# Patient Record
Sex: FEMALE | URBAN - METROPOLITAN AREA
[De-identification: names, ages, dates, MRNs, and addresses within clinical notes are randomized per-mention and may not be internally consistent; named-entity substitution may affect disease eponyms.]

---

## 2019-12-30 ENCOUNTER — HOSPITAL ENCOUNTER (OUTPATIENT)
Dept: TELEMEDICINE | Facility: HOSPITAL | Age: 84
Discharge: HOME | End: 2019-12-30

## 2020-02-24 ENCOUNTER — HOSPITAL ENCOUNTER (OUTPATIENT)
Dept: INTENSIVE CARE | Facility: HOSPITAL | Age: 85
End: 2020-02-26
Attending: INTERNAL MEDICINE | Admitting: INTERNAL MEDICINE

## 2020-02-24 LAB
ABS NEUT (OLG): 3.51 X10(3)/MCL (ref 2.1–9.2)
ALBUMIN SERPL-MCNC: 3.8 GM/DL (ref 3.4–5)
ALBUMIN/GLOB SERPL: 1 {RATIO}
ALP SERPL-CCNC: 118 UNIT/L (ref 38–126)
ALT SERPL-CCNC: 27 UNIT/L (ref 12–78)
APPEARANCE, UA: CLEAR
APTT PPP: 30.9 SECOND(S) (ref 23.2–33.7)
AST SERPL-CCNC: 41 UNIT/L (ref 15–37)
BACTERIA SPEC CULT: ABNORMAL /HPF
BASOPHILS # BLD AUTO: 0 X10(3)/MCL (ref 0–0.2)
BASOPHILS NFR BLD AUTO: 0 %
BILIRUB SERPL-MCNC: 0.7 MG/DL (ref 0.2–1)
BILIRUB UR QL STRIP: NEGATIVE
BILIRUBIN DIRECT+TOT PNL SERPL-MCNC: 0.2 MG/DL (ref 0–0.2)
BILIRUBIN DIRECT+TOT PNL SERPL-MCNC: 0.5 MG/DL (ref 0–0.8)
BUN SERPL-MCNC: 21 MG/DL (ref 7–18)
CALCIUM SERPL-MCNC: 9.2 MG/DL (ref 8.5–10.1)
CHLORIDE SERPL-SCNC: 112 MMOL/L (ref 98–107)
CO2 SERPL-SCNC: 22 MMOL/L (ref 21–32)
COLOR UR: YELLOW
CREAT SERPL-MCNC: 0.85 MG/DL (ref 0.55–1.02)
EOSINOPHIL # BLD AUTO: 0 X10(3)/MCL (ref 0–0.9)
EOSINOPHIL NFR BLD AUTO: 1 %
ERYTHROCYTE [DISTWIDTH] IN BLOOD BY AUTOMATED COUNT: 15.2 % (ref 11.5–17)
GLOBULIN SER-MCNC: 3.7 GM/DL (ref 2.4–3.5)
GLUCOSE (UA): NEGATIVE
GLUCOSE SERPL-MCNC: 77 MG/DL (ref 74–106)
HCT VFR BLD AUTO: 32 % (ref 37–47)
HGB BLD-MCNC: 10.4 GM/DL (ref 12–16)
HGB UR QL STRIP: NEGATIVE
INR PPP: 1 (ref 0–1.3)
KETONES UR QL STRIP: NEGATIVE
LEUKOCYTE ESTERASE UR QL STRIP: ABNORMAL
LYMPHOCYTES # BLD AUTO: 1.5 X10(3)/MCL (ref 0.6–4.6)
LYMPHOCYTES NFR BLD AUTO: 26 %
MCH RBC QN AUTO: 29.8 PG (ref 27–31)
MCHC RBC AUTO-ENTMCNC: 32.5 GM/DL (ref 33–36)
MCV RBC AUTO: 91.7 FL (ref 80–94)
MONOCYTES # BLD AUTO: 0.6 X10(3)/MCL (ref 0.1–1.3)
MONOCYTES NFR BLD AUTO: 10 %
NEUTROPHILS # BLD AUTO: 3.51 X10(3)/MCL (ref 2.1–9.2)
NEUTROPHILS NFR BLD AUTO: 63 %
NITRITE UR QL STRIP: NEGATIVE
PH UR STRIP: 5 [PH] (ref 5–9)
PLATELET # BLD AUTO: 190 X10(3)/MCL (ref 130–400)
PMV BLD AUTO: 11.3 FL (ref 9.4–12.4)
POTASSIUM SERPL-SCNC: 4.4 MMOL/L (ref 3.5–5.1)
PROT SERPL-MCNC: 7.5 GM/DL (ref 6.4–8.2)
PROT UR QL STRIP: ABNORMAL
PROTHROMBIN TIME: 12.6 SECOND(S) (ref 11.1–13.7)
RBC # BLD AUTO: 3.49 X10(6)/MCL (ref 4.2–5.4)
RBC #/AREA URNS HPF: ABNORMAL /[HPF]
SODIUM SERPL-SCNC: 141 MMOL/L (ref 136–145)
SP GR UR STRIP: 1.01 (ref 1–1.03)
SQUAMOUS EPITHELIAL, UA: 7 /HPF (ref 0–4)
UROBILINOGEN UR STRIP-ACNC: 0.2
WBC # SPEC AUTO: 5.6 X10(3)/MCL (ref 4.5–11.5)
WBC #/AREA URNS HPF: 18 /HPF (ref 0–3)

## 2020-02-25 LAB
ABS NEUT (OLG): 2 X10(3)/MCL (ref 2.1–9.2)
ALBUMIN SERPL-MCNC: 3.2 GM/DL (ref 3.4–5)
ALBUMIN/GLOB SERPL: 0.9 {RATIO}
ALP SERPL-CCNC: 107 UNIT/L (ref 38–126)
ALT SERPL-CCNC: 24 UNIT/L (ref 12–78)
AST SERPL-CCNC: 32 UNIT/L (ref 15–37)
BASOPHILS # BLD AUTO: 0 X10(3)/MCL (ref 0–0.2)
BASOPHILS NFR BLD AUTO: 0 %
BILIRUB SERPL-MCNC: 0.5 MG/DL (ref 0.2–1)
BILIRUBIN DIRECT+TOT PNL SERPL-MCNC: 0.1 MG/DL (ref 0–0.2)
BILIRUBIN DIRECT+TOT PNL SERPL-MCNC: 0.4 MG/DL (ref 0–0.8)
BUN SERPL-MCNC: 20 MG/DL (ref 7–18)
CALCIUM SERPL-MCNC: 9 MG/DL (ref 8.5–10.1)
CHLORIDE SERPL-SCNC: 113 MMOL/L (ref 98–107)
CO2 SERPL-SCNC: 22 MMOL/L (ref 21–32)
CREAT SERPL-MCNC: 0.87 MG/DL (ref 0.55–1.02)
EOSINOPHIL # BLD AUTO: 0.1 X10(3)/MCL (ref 0–0.9)
EOSINOPHIL NFR BLD AUTO: 2 %
ERYTHROCYTE [DISTWIDTH] IN BLOOD BY AUTOMATED COUNT: 15.2 % (ref 11.5–17)
GLOBULIN SER-MCNC: 3.4 GM/DL (ref 2.4–3.5)
GLUCOSE SERPL-MCNC: 95 MG/DL (ref 74–106)
HCT VFR BLD AUTO: 32.3 % (ref 37–47)
HGB BLD-MCNC: 10.4 GM/DL (ref 12–16)
LYMPHOCYTES # BLD AUTO: 2.2 X10(3)/MCL (ref 0.6–4.6)
LYMPHOCYTES NFR BLD AUTO: 44 %
MCH RBC QN AUTO: 29.7 PG (ref 27–31)
MCHC RBC AUTO-ENTMCNC: 32.2 GM/DL (ref 33–36)
MCV RBC AUTO: 92.3 FL (ref 80–94)
MONOCYTES # BLD AUTO: 0.7 X10(3)/MCL (ref 0.1–1.3)
MONOCYTES NFR BLD AUTO: 13 %
NEUTROPHILS # BLD AUTO: 2 X10(3)/MCL (ref 2.1–9.2)
NEUTROPHILS NFR BLD AUTO: 40 %
PLATELET # BLD AUTO: 167 X10(3)/MCL (ref 130–400)
PMV BLD AUTO: 10.9 FL (ref 9.4–12.4)
POTASSIUM SERPL-SCNC: 4.7 MMOL/L (ref 3.5–5.1)
PROT SERPL-MCNC: 6.6 GM/DL (ref 6.4–8.2)
RBC # BLD AUTO: 3.5 X10(6)/MCL (ref 4.2–5.4)
SODIUM SERPL-SCNC: 141 MMOL/L (ref 136–145)
WBC # SPEC AUTO: 5 X10(3)/MCL (ref 4.5–11.5)

## 2021-04-24 ENCOUNTER — HISTORICAL (OUTPATIENT)
Dept: ADMINISTRATIVE | Facility: HOSPITAL | Age: 86
End: 2021-04-24

## 2021-04-30 LAB
FINAL CULTURE: NORMAL
FINAL CULTURE: NORMAL

## 2022-04-30 NOTE — ED PROVIDER NOTES
Patient:   Chanda Gracia            MRN: [id number]            FIN: [id number]               Age:   84 years     Sex:  Female     :  1935   Associated Diagnoses:   HTN (hypertension); HLD (hyperlipidemia); Hypoglycemia; TIA (transient ischemic attack); Diabetes   Author:   Lg Pisano MD      Basic Information   Time seen: Date & time 2020 11:45:00.   History source: Patient, family, EMS.   Arrival mode: Ambulance.   History limitation: None.   Additional information: Patient's physician(s): Maria Antonia ALEJO , Philipp Nathan, Chief Complaint from Nursing Triage Note : Chief Complaint   2020 11:27 CST      Chief Complaint           [facility]- pt reports she felt wobbly at 0400 this AM and couldn't use R hand. states numbness has resolved. [facility] then states pt was CBG 30 on scene with slurred speech. D5W 250 ml en route. CBG 78 on arrival. GCS 15.  .      History of Present Illness   The patient presents with 85 y/o female who presents via [facility] for right side weakness and slurred speech. EMS found cbg to be 30. gave d5w and now cbg 78. she has no slurred speech, no drift/droop. did not take her oral glucose meds this AM. rey coates and   I, Dr. Pisano, assumed care of this patient at 1156.    84 year old female with history of HTN, HLD, and DM presents to ED via EMS c/o R sided weakness and abnormal balance upon waking this morning around 0400. Patient states she had numbness and weakness in her right arm and her right leg.  She states she couldn't move the arm but it was very difficult.  She states it did not feel as if she slept on it wrong she states it felt different and it also involved the leg.  Family states patient fell to floor. Patient states symptoms have improved. She denies blood thinners or difficulty swallowing. CBG initially 30. Patient was given D5W 250ml per EMS. CBG 78 on arrival. When I got in the room patient was half-way through eating sandwich tray. .  The onset  was 0400 this morning upon waking.  The course/duration of symptoms is constant and improving.  The blood sugar was 30 mg/dL and method of detection: emergency medical services.  Risk factors consist of diabetes mellitus and hypertension.  Prior episodes: none.  Therapy today: emergency medical services.  Associated symptoms: none.  Additional history: none.        Review of Systems   Constitutional symptoms:  Negative except as documented in HPI   Skin symptoms:  Negative except as documented in HPI.   Eye symptoms:  Negative except as documented in HPI.   ENMT symptoms:  Negative except as documented in HPI.   Respiratory symptoms:  Negative except as documented in HPI.   Cardiovascular symptoms:  Negative except as documented in HPI.   Gastrointestinal symptoms:  Negative except as documented in HPI.   Genitourinary symptoms:  Negative except as documented in HPI.   Musculoskeletal symptoms:  Negative except as documented in HPI.   Neurologic symptoms:  Weakness (R sided), abnormal balance, No difficulty swallowing,    Endocrine symptoms:  Hypoglycemia.      Health Status   Allergies:    Allergic Reactions (Selected)  No Known Allergies  No Known Medication Allergies.   Medications: Per nurse's notes.      Past Medical/ Family/ Social History   Medical history:    Resolved  Diabetes mellitus (805122642):  Resolved.  HTN - Hypertension ([id number]):  Resolved.  Hypercholesterolemia ([id number]):  Resolved.  .   Surgical history:    Hysterectomy TONY IN HER 30'S.  .   Family history:    No family history items have been selected or recorded.  .   Social history: Alcohol use: Occasionally, Tobacco use: Denies, Drug use: Denies, Occupation: Unemployed, Family/social situation: Unmarried.      Physical Examination               Vital Signs   Vital Signs   2/24/2020 11:39 CST      Peripheral Pulse Rate     62 bpm                             Heart Rate Monitored      62 bpm                             Respiratory Rate           17 br/min                             SpO2                      97 %                             Oxygen Therapy            Room air  .   Measurements   2/24/2020 11:27 CST      Weight Dosing             55 kg                             Weight Measured and Calculated in Lbs     121.25 lb                             Weight Estimated          55 kg                             Height/Length Dosing      167 cm                             Height/Length Estimated   167 cm                             Body Mass Index Estimated 19.72 kg/m2  .   Basic Oxygen Information   2/24/2020 11:39 CST      SpO2                      97 %                             Oxygen Therapy            Room air  .   General:  Alert, no acute distress.    Skin:  Warm, dry   Head:  Normocephalic, atraumatic   Eye:  Pupils are equal, round and reactive to light, extraocular movements are intact.    Cardiovascular:  Regular rate and rhythm, Normal peripheral perfusion, No edema.    Respiratory:  Lungs are clear to auscultation, respirations are non-labored, breath sounds are equal, Symmetrical chest wall expansion.    Gastrointestinal:  Soft, Nontender, Non distended, Normal bowel sounds, Guarding: Negative, Rebound: Negative.    Musculoskeletal:  No deformity.   Neurological:  Alert and oriented to person, place, time, and situation, No focal neurological deficit observed, CN II-XII intact, normal sensory observed, normal motor observed, normal speech observed, ambulates WNL, Reflexes: pronator drift negative, Coordination: Finger(s) to nose normal.    Psychiatric:  Cooperative, appropriate mood & affect      Medical Decision Making   Rationale:  Symptoms from this morning clearly sound strokelike they have all completely resolved.  No previous stroke evaluation so she will require admission.  Aspirin was given in the ED.  Her sugar improved after being fed in the ED.   Documents reviewed:  Emergency department nurses' notes.   Orders  Launch  Order Profile (Selected)   Inpatient Orders  Ordered  Possible Stroke: 02/24/20 11:35:30 CST, Once  Ordered (Collected)  INR - Protime: STAT collect, 02/24/20 12:54:04 CST, BLOOD, Collected, Stop date 02/24/20 12:54:00 CST, Lab Collect  PTT: STAT collect, 02/24/20 12:54:04 CST, BLOOD, Collected, Stop date 02/24/20 12:54:00 CST, Lab Collect  Ordered (In-Lab)  CMP: STAT collect, 02/24/20 12:54:04 CST, BLOOD, Collected, Stop date 02/24/20 12:54:00 CST, Lab Collect  Urinalysis with reflex to culture: Stat collect, Urine, 02/24/20 11:55:00 CST, Stop date 02/24/20 11:55:00 CST, Nurse collect  Completed  Automated Diff: NOW collect, 02/24/20 12:54:00 CST, Blood, Collected, Stop date 02/24/20 12:54:00 CST, Lab Collect, Print Label By Order Location, 02/24/20 11:46:00 CST  CBC w/ Auto Diff: NOW collect, 02/24/20 12:54:04 CST, BLOOD, Collected, Stop date 02/24/20 12:54:00 CST, Lab Collect  CT Head W/O Contrast: Stat, 02/24/20 11:54:00 CST, Other (please specify), CVA, None, Ambulatory, Rad Type, Schedule this test, 02/24/20 11:54:00 CST  POC CBG: Blood, Stat collect, Collected, 02/24/20 11:32:07 CST  POC CBG: Blood, Stat collect, Collected, 02/24/20 12:46:26 CST  aspirin 325 mg oral tablet: 325 mg, form: Tab, Oral, Once, first dose 02/24/20 12:55:00 CST, stop date 02/24/20 12:55:00 CST, STAT, 4 chew tab = 5 grain ASA  .   Electrocardiogram:  Time 2/24/2020 14:27:00, rate 52, Sinus bradycardia no ST elevation or depression.    Results review:  Lab results : Lab View   2/24/2020 12:54 CST      Sodium Lvl                141 mmol/L                             Potassium Lvl             4.4 mmol/L                             Chloride                  112 mmol/L  HI                             CO2                       22.0 mmol/L                             Calcium Lvl               9.2 mg/dL                             Glucose Lvl               77 mg/dL                             BUN                       21.0 mg/dL  HI                              Creatinine                0.85 mg/dL                             eGFR-AA                   >60 mL/min/1.73 m2  NA                             eGFR-KEY                  >60 mL/min/1.73 m2  NA                             Bili Total                0.7 mg/dL                             Bili Direct               0.20 mg/dL                             Bili Indirect             0.50 mg/dL                             AST                       41 unit/L  HI                             ALT                       27 unit/L                             Alk Phos                  118 unit/L                             Total Protein             7.5 gm/dL                             Albumin Lvl               3.80 gm/dL                             Globulin                  3.70 gm/dL  HI                             A/G Ratio                 1.0  NA                             PT                        12.6 second(s)                             INR                       1.0                             PTT                       30.9 second(s)                             WBC                       5.6 x10(3)/mcL                             RBC                       3.49 x10(6)/mcL  LOW                             Hgb                       10.4 gm/dL  LOW                             Hct                       32.0 %  LOW                             Platelet                  190 x10(3)/mcL                             MCV                       91.7 fL                             MCH                       29.8 pg                             MCHC                      32.5 gm/dL  LOW                             RDW                       15.2 %                             MPV                       11.3 fL                             Abs Neut                  3.51 x10(3)/mcL                             Neutro Auto               63 %  NA                             Lymph Auto                26 %  NA                              Mono Auto                 10 %  NA                             Eos Auto                  1 %  NA                             Abs Eos                   0.0 x10(3)/mcL                             Basophil Auto             0 %  NA                             Abs Neutro                3.51 x10(3)/mcL                             Abs Lymph                 1.5 x10(3)/mcL                             Abs Mono                  0.6 x10(3)/mcL                             Abs Baso                  0.0 x10(3)/mcL    2/24/2020 12:45 CST      UA Appear                 CLEAR                             UA Color                  YELLOW                             UA Spec Grav              1.012                             UA Bili                   Negative                             UA pH                     5.0                             UA Urobilinogen           0.2                             UA Blood                  Negative                             UA Glucose                Negative                             UA Ketones                Negative                             UA Protein                Trace                             UA Nitrite                Negative                             UA Leuk Est               3+                             UA WBC                    18 /HPF  HI                             UA RBC                    NONE SEEN                             UA Bacteria               1+ /HPF                             UA Squam Epithelial       7 /HPF  HI    .   Radiology results:  Rad Results (ST)  < 12 hrs   Accession: [id number]  Order: CT Head W/O Contrast  Report Dt/Tm: 02/24/2020 12:26  Report:      CLINICAL: CVA      TECHNIQUE: Sequential axial images were performed of the brain without  contrast.      Dose product length of 584 mGycm. Automated exposure control was  utilized to minimize radiation dose.     COMPARISON: None available.      FINDINGS:  There is no intracranial mass effect, midline  shift,  hydrocephalus or hemorrhage. There is no sulcal effacement or low  attenuation changes to suggest recent large vessel territory  infarction.  Chronic appearing periventricular and subcortical white  matter low attenuation changes are consistent with chronic  microangiopathic ischemia. The ventricular system and sulcal markings  prominence is consistent with atrophy. There is no acute extra axial  fluid collection. Visualized paranasal sinuses are clear without  mucosal thickening, polypoidal abnormality or air-fluid levels.  Mastoid air cells aeration is optimal.      IMPRESSION:     1. No acute intracranial findings identified.  2.. Chronic microangiopathic ischemia and atrophy.      .      Reexamination/ Reevaluation   Vital signs   Basic Oxygen Information   2/24/2020 11:27 CST      SpO2                      99 %                             Oxygen Therapy            Room air        Impression and Plan   Diagnosis   HTN (hypertension) (OOH16-QG I10)   HLD (hyperlipidemia) (GQY36-YS E78.5)   Hypoglycemia (AWP18-OD E16.2)   TIA (transient ischemic attack) (FFF30-TO G45.9)   Diabetes (PGA26-SK E11.9)      Calls-Consults   -  Ann-Marie King.   Plan   Condition: Stable.    Disposition: Admit to Inpatient Telemetry Unit.    Counseled: Patient, Family, Regarding diagnosis, Regarding diagnostic results, Regarding treatment plan, Patient indicated understanding of instructions.    Notes: I, Claudio Woods, acted solely as a scribe for and in the presence of Dr. Pisano who performed the service., I, Dr Pisano have read note from scribe and I agree with history and physical except as amended by me.  All information was dictated from my history and my examination of patient..